# Patient Record
Sex: MALE
[De-identification: names, ages, dates, MRNs, and addresses within clinical notes are randomized per-mention and may not be internally consistent; named-entity substitution may affect disease eponyms.]

---

## 2020-03-09 PROBLEM — Z00.00 ENCOUNTER FOR PREVENTIVE HEALTH EXAMINATION: Status: ACTIVE | Noted: 2020-03-09

## 2020-03-13 ENCOUNTER — APPOINTMENT (OUTPATIENT)
Dept: ENDOCRINOLOGY | Facility: CLINIC | Age: 60
End: 2020-03-13
Payer: MEDICAID

## 2020-03-13 VITALS
BODY MASS INDEX: 24.59 KG/M2 | SYSTOLIC BLOOD PRESSURE: 130 MMHG | HEIGHT: 69 IN | HEART RATE: 75 BPM | WEIGHT: 166 LBS | DIASTOLIC BLOOD PRESSURE: 80 MMHG | OXYGEN SATURATION: 95 %

## 2020-03-13 DIAGNOSIS — Z86.39 PERSONAL HISTORY OF OTHER ENDOCRINE, NUTRITIONAL AND METABOLIC DISEASE: ICD-10-CM

## 2020-03-13 DIAGNOSIS — Z78.9 OTHER SPECIFIED HEALTH STATUS: ICD-10-CM

## 2020-03-13 DIAGNOSIS — Z86.79 PERSONAL HISTORY OF OTHER DISEASES OF THE CIRCULATORY SYSTEM: ICD-10-CM

## 2020-03-13 DIAGNOSIS — Z84.2 FAMILY HISTORY OF OTHER DISEASES OF THE GENITOURINARY SYSTEM: ICD-10-CM

## 2020-03-13 DIAGNOSIS — Z82.49 FAMILY HISTORY OF ISCHEMIC HEART DISEASE AND OTHER DISEASES OF THE CIRCULATORY SYSTEM: ICD-10-CM

## 2020-03-13 DIAGNOSIS — Z83.3 FAMILY HISTORY OF DIABETES MELLITUS: ICD-10-CM

## 2020-03-13 DIAGNOSIS — Z86.59 PERSONAL HISTORY OF OTHER MENTAL AND BEHAVIORAL DISORDERS: ICD-10-CM

## 2020-03-13 PROCEDURE — 99203 OFFICE O/P NEW LOW 30 MIN: CPT

## 2020-03-13 RX ORDER — CITALOPRAM HYDROBROMIDE 20 MG/1
20 TABLET, FILM COATED ORAL DAILY
Refills: 0 | Status: ACTIVE | COMMUNITY

## 2020-03-13 RX ORDER — LOSARTAN POTASSIUM 100 MG/1
100 TABLET, FILM COATED ORAL DAILY
Refills: 0 | Status: ACTIVE | COMMUNITY

## 2020-03-13 RX ORDER — PRAVASTATIN SODIUM 40 MG/1
40 TABLET ORAL DAILY
Refills: 0 | Status: ACTIVE | COMMUNITY

## 2020-03-13 RX ORDER — QUETIAPINE FUMARATE 200 MG/1
200 TABLET ORAL DAILY
Refills: 0 | Status: ACTIVE | COMMUNITY

## 2020-03-13 RX ORDER — MULTIVITAMIN
TABLET ORAL DAILY
Refills: 0 | Status: ACTIVE | COMMUNITY

## 2020-03-13 RX ORDER — OMEGA-3/DHA/EPA/FISH OIL 300-1000MG
1000 CAPSULE ORAL
Refills: 0 | Status: ACTIVE | COMMUNITY

## 2020-03-13 RX ORDER — ASCORBIC ACID 500 MG
500 TABLET ORAL DAILY
Refills: 0 | Status: ACTIVE | COMMUNITY

## 2020-03-13 RX ORDER — CHROMIUM 200 MCG
400 TABLET ORAL
Refills: 0 | Status: ACTIVE | COMMUNITY

## 2020-03-13 RX ORDER — DUTASTERIDE 0.5 MG/1
0.5 CAPSULE, LIQUID FILLED ORAL
Refills: 0 | Status: ACTIVE | COMMUNITY

## 2020-03-13 NOTE — HISTORY OF PRESENT ILLNESS
[FreeTextEntry1] : 59 yr old referred for concern of macroadenoma which was an incidental finding when being evaluated for a fall work up by neurology -no neurological deficits presumed to be medication side effects. \par as per pit focussed MRI gland is enlarged and has measuring 1x 1.2 x 1 cm , sup border is Convex, and may represent  a macroadenoma -?suboptimal reporting, no images to review \par has been working with neurology for the same \par headaches -none, visual deficits -none \par  symptoms to suggest hypercortisolism -none \par symptoms to suggest acromegaly -none \par symptoms to suggest DI - has thirst but related to other medications \par symptoms to suggest hypothyroidism  -none \par symptoms to suggest hypocortisolism - none, no low energy levels \par symptoms to suggest hypogonadism none \par on Seroquel -yes 200 mg HS

## 2020-03-25 LAB
ALBUMIN SERPL ELPH-MCNC: 4.6 G/DL
ALP BLD-CCNC: 54 U/L
ALT SERPL-CCNC: 21 U/L
ANION GAP SERPL CALC-SCNC: 14 MMOL/L
AST SERPL-CCNC: 26 U/L
BILIRUB SERPL-MCNC: 0.4 MG/DL
BUN SERPL-MCNC: 8 MG/DL
CALCIUM SERPL-MCNC: 9.1 MG/DL
CHLORIDE SERPL-SCNC: 102 MMOL/L
CO2 SERPL-SCNC: 26 MMOL/L
CORTIS 24H UR-MCNC: 24 H
CORTIS 24H UR-MRATE: 67 MCG/24 H
CREAT SERPL-MCNC: 0.87 MG/DL
ESTIMATED AVERAGE GLUCOSE: 111 MG/DL
FSH SERPL-MCNC: 5.1 IU/L
GLUCOSE SERPL-MCNC: 106 MG/DL
HBA1C MFR BLD HPLC: 5.5 %
IGF-1 INTERP: NORMAL
IGF-I BLD-MCNC: 227 NG/ML
LH SERPL-ACNC: 5.2 IU/L
MONOMERIC PROLACTIN (ICMA)*: 5 NG/ML
PERCENT MACROPROLACTIN: 0 %
POTASSIUM SERPL-SCNC: 3.8 MMOL/L
PROLACTIN SERPL-MCNC: 6.3 NG/ML
PROLACTIN, SERUM (ICMA)*: 5 NG/ML
PROT SERPL-MCNC: 6.7 G/DL
SHBG SERPL-SCNC: 33 NMOL/L
SODIUM SERPL-SCNC: 141 MMOL/L
SPECIMEN VOL 24H UR: 1425 ML
T4 FREE SERPL-MCNC: 1 NG/DL
TESTOST BND SERPL-MCNC: 4.4 PG/ML
TESTOST SERPL-MCNC: 201.8 NG/DL
TSH SERPL-ACNC: 1.02 UIU/ML

## 2020-03-27 RX ORDER — DEXAMETHASONE 1 MG/1
1 TABLET ORAL
Qty: 1 | Refills: 0 | Status: ACTIVE | COMMUNITY
Start: 2020-03-27 | End: 1900-01-01

## 2020-04-15 ENCOUNTER — APPOINTMENT (OUTPATIENT)
Dept: NEUROSURGERY | Facility: CLINIC | Age: 60
End: 2020-04-15
Payer: MEDICAID

## 2020-04-15 PROCEDURE — 99202 OFFICE O/P NEW SF 15 MIN: CPT | Mod: 95

## 2020-04-15 NOTE — HISTORY OF PRESENT ILLNESS
[FreeTextEntry1] : Asympotmatic [de-identified] : Due to COVID 19 pandemic, patient has agreed to see me via telemedicine visit where we will review her clinical history and imaging studies but unfortunately I am not able to perform a thorough physical exam, which may impact my capability to make an accurate diagnosis and patient understands that. \par During the telemedicine visit patient I was located in my home office and patient was located in his home at [address] [place] [address], [id number].\par \par Mr. GIORGIO RG is a 59 year year-old patient with no significant PMH who is here for neurosurgical consultation regarding management options for recently diagnosed non-secreting pituitary macroadenoma. Patient has seen endocrinology and pituitary hormonal work up was not suggestive of secreting type of pituitary adenoma. Patient has not had visual field studies yet.\par Patient denies seizures, HA,N/V, visual deficits, double vision, face pain, face asymmetry, hearing deficit, tinnitus, swallowing difficulties, motor or sensation deficits, balance or gait problems, speech or language issues.\par \par \par

## 2020-04-15 NOTE — DATA REVIEWED
[de-identified] : Brain MRI March 2020.\par Pituitary macroadenoma with no supra sellar extension or cavernous sinus invasion, measuring 10 x 10 x 12 mm.

## 2020-04-15 NOTE — PHYSICAL EXAM
[General Appearance - Alert] : alert [General Appearance - In No Acute Distress] : in no acute distress [Oriented To Time, Place, And Person] : oriented to person, place, and time [Cranial Nerves Oculomotor (III)] : extraocular motion intact [Cranial Nerves Facial (VII)] : face symmetrical [Cranial Nerves Accessory (XI - Cranial And Spinal)] : head turning and shoulder shrug symmetric [Cranial Nerves Hypoglossal (XII)] : there was no tongue deviation with protrusion [FreeTextEntry5] : E [FreeTextEntry6] : No drift.

## 2020-04-15 NOTE — ASSESSMENT
[FreeTextEntry1] : I have discussed the natural history and treatment options for nonsecreting pituitary macroadenoma with the patient. I explained the indications for observation and imaging surveillance, radiation therapy, radiosurgery and surgery done through an endoscopic endonasal transsphenoidal approach. I explained the indications of a combination of these treatment options. I discussed the risks, benefits, possible complications and expected outcome related to each treatment option. In the end my recommendation is for obtaining baseline neuroophthalmology assessment and visual field studies and follow up in 6 months with repeat pituitary MRI with and without contrast to discuss the results. Patient understood and agreed with our plan of care and decided to move forward with it. All questions were answered.\par \par \par \par

## 2020-04-15 NOTE — REASON FOR VISIT
[Consultation] : a consultation visit [Referred By: _________] : Patient was referred by MALIKA [FreeTextEntry1] : Pituitary macroadenoma, non-secreting type.

## 2020-10-22 ENCOUNTER — APPOINTMENT (OUTPATIENT)
Dept: NEUROSURGERY | Facility: CLINIC | Age: 60
End: 2020-10-22
Payer: MEDICAID

## 2020-10-22 VITALS
DIASTOLIC BLOOD PRESSURE: 76 MMHG | WEIGHT: 170 LBS | BODY MASS INDEX: 25.18 KG/M2 | HEIGHT: 69 IN | SYSTOLIC BLOOD PRESSURE: 127 MMHG | TEMPERATURE: 97.2 F | HEART RATE: 69 BPM

## 2020-10-22 DIAGNOSIS — D35.2 BENIGN NEOPLASM OF PITUITARY GLAND: ICD-10-CM

## 2020-10-22 PROCEDURE — 99215 OFFICE O/P EST HI 40 MIN: CPT

## 2020-10-22 NOTE — REASON FOR VISIT
[FreeTextEntry1] : Mr. GIORGIO RG is a 59 year year-old patient with no significant PMH who is here for neurosurgical followup regarding management options for previously diagnosed non-secreting pituitary macroadenoma. Patient has seen endocrinology and pituitary hormonal work up was not suggestive of secreting type of pituitary adenoma. Patient has not had visual field studies yet.\par Patient denies seizures, HA,N/V, visual deficits, double vision, face pain, face asymmetry, hearing deficit, tinnitus, swallowing difficulties, motor or sensation deficits, balance or gait problems, speech or language issues.\par \par Patient returns s/p repeat MRI pituitary (see report below) and ophthalmology evaluation.  \par \par

## 2020-10-22 NOTE — DATA REVIEWED
[de-identified] : MRI pituitary with and without contrast: Enlarged pituitary gland with a 1.2x 0.8x0.7cm area of diminished enhancement within the left and central portion of the gland with slight extension across the midline.  There is slight deviation of pituitary stalk and underlying macroadenoma is felt present.  There is no evidence of chiasmatic impingement or parasellar extension.  Presumed mild Microvascular disease in periventricular white matter bilaterally.  No infarcts or other mass lesions or hemorrhages are noted.

## 2020-10-22 NOTE — ASSESSMENT
[FreeTextEntry1] : I have discussed the natural history and treatment options for nonfunctioning pituitary macroadenoma with the patient and her . I explained the indications for observation and imaging surveillance, , radiation therapy, radiosurgery and surgery done through an endoscopic endonasal transsphenoidal approach. I explained the indications of a combination of these treatment options. I discussed the risks, benefits, possible complications and expected outcome related to each treatment option. The risks of surgery were discussed in detail including but not limited to postoperative infection at the surgical site, hospital acquired pneumonia, hospital acquired urinary tract infection, postoperative meningitis, CSF leak, stroke (ischemic and hemorrhagic), metabolic disorders like diabetes insipidus (transient and permanent), hyponatremia, postoperative seizures, worsening neurological function due to cranial nerve or brain injury, cardiovascular complications (MI, PE, DVT) and I also explained that these complications could lead to sepsis, coma or even death. I discussed the fact that some of these complications may require a second surgical procedure to correct them.\par

## 2020-10-22 NOTE — END OF VISIT
[FreeTextEntry3] : I have seen the patient and reviewed the case together with PA and I agree with the final recommendations and plan of care.\par \par Spike Swain MD\par Neurosurgery\par \par  [Time Spent: ___ minutes] : I have spent [unfilled] minutes of time on the encounter. [>50% of the face to face encounter time was spent on counseling and/or coordination of care for ___] : Greater than 50% of the face to face encounter time was spent on counseling and/or coordination of care for [unfilled]

## 2020-10-22 NOTE — PHYSICAL EXAM

## 2020-10-23 ENCOUNTER — APPOINTMENT (OUTPATIENT)
Dept: ENDOCRINOLOGY | Facility: CLINIC | Age: 60
End: 2020-10-23